# Patient Record
Sex: FEMALE | Race: WHITE | Employment: UNEMPLOYED | URBAN - METROPOLITAN AREA
[De-identification: names, ages, dates, MRNs, and addresses within clinical notes are randomized per-mention and may not be internally consistent; named-entity substitution may affect disease eponyms.]

---

## 2023-04-24 ENCOUNTER — HOSPITAL ENCOUNTER (EMERGENCY)
Age: 28
Discharge: LEFT BEFORE DISCHARGE | End: 2023-04-24
Payer: MEDICAID

## 2023-04-24 VITALS
BODY MASS INDEX: 26.5 KG/M2 | DIASTOLIC BLOOD PRESSURE: 72 MMHG | TEMPERATURE: 98.4 F | RESPIRATION RATE: 16 BRPM | WEIGHT: 164.9 LBS | HEIGHT: 66 IN | SYSTOLIC BLOOD PRESSURE: 135 MMHG | HEART RATE: 100 BPM | OXYGEN SATURATION: 100 %

## 2023-04-24 LAB
COMMENT, HOLDF: NORMAL
HCG SERPL QL: NEGATIVE
HCG UR QL: NEGATIVE
SAMPLES BEING HELD,HOLD: NORMAL

## 2023-04-24 PROCEDURE — 75810000275 HC EMERGENCY DEPT VISIT NO LEVEL OF CARE

## 2023-04-24 PROCEDURE — 84703 CHORIONIC GONADOTROPIN ASSAY: CPT

## 2023-04-24 PROCEDURE — 81025 URINE PREGNANCY TEST: CPT

## 2023-04-24 PROCEDURE — 36415 COLL VENOUS BLD VENIPUNCTURE: CPT

## 2023-04-24 RX ORDER — BUPRENORPHINE AND NALOXONE 8; 2 MG/1; MG/1
2 FILM, SOLUBLE BUCCAL; SUBLINGUAL DAILY
COMMUNITY

## 2023-04-24 NOTE — ED NOTES
Pt politely stating she would like to leave and does not want to see MD or get results of labs. Pt made aware she will be leaving prior to medical screening. Pt sts he has follow up scheduled with her OBGYN.   VSS. IV d/c. Pt ambulatory w/steady gait.

## 2023-04-24 NOTE — ED TRIAGE NOTES
Patient presents ambulatory to treatment area with a steady gait. Patient states she has been having pregnancy symptoms, but has had negative results. Patient has been unable to get in with her PCP. Patient states she has had lower back pain, breast tenderness with lactation, swelling of lower extremities. LMP was 23 days ago.